# Patient Record
Sex: MALE | Race: WHITE | HISPANIC OR LATINO
[De-identification: names, ages, dates, MRNs, and addresses within clinical notes are randomized per-mention and may not be internally consistent; named-entity substitution may affect disease eponyms.]

---

## 2022-02-24 PROBLEM — Z00.00 ENCOUNTER FOR PREVENTIVE HEALTH EXAMINATION: Status: ACTIVE | Noted: 2022-02-24

## 2022-03-01 ENCOUNTER — APPOINTMENT (OUTPATIENT)
Dept: ORTHOPEDIC SURGERY | Facility: CLINIC | Age: 57
End: 2022-03-01
Payer: COMMERCIAL

## 2022-03-01 VITALS
BODY MASS INDEX: 34.1 KG/M2 | HEART RATE: 70 BPM | HEIGHT: 68 IN | DIASTOLIC BLOOD PRESSURE: 76 MMHG | WEIGHT: 225 LBS | SYSTOLIC BLOOD PRESSURE: 152 MMHG

## 2022-03-01 DIAGNOSIS — Z72.89 OTHER PROBLEMS RELATED TO LIFESTYLE: ICD-10-CM

## 2022-03-01 DIAGNOSIS — Z83.3 FAMILY HISTORY OF DIABETES MELLITUS: ICD-10-CM

## 2022-03-01 DIAGNOSIS — M79.642 PAIN IN LEFT HAND: ICD-10-CM

## 2022-03-01 DIAGNOSIS — Z82.49 FAMILY HISTORY OF ISCHEMIC HEART DISEASE AND OTHER DISEASES OF THE CIRCULATORY SYSTEM: ICD-10-CM

## 2022-03-01 PROCEDURE — 73130 X-RAY EXAM OF HAND: CPT | Mod: LT

## 2022-03-01 PROCEDURE — 99203 OFFICE O/P NEW LOW 30 MIN: CPT

## 2022-03-01 RX ORDER — OLMESARTAN MEDOXOMIL 20 MG/1
20 TABLET, FILM COATED ORAL
Refills: 0 | Status: ACTIVE | COMMUNITY

## 2022-03-01 RX ORDER — MELOXICAM 15 MG/1
15 TABLET ORAL
Qty: 30 | Refills: 0 | Status: ACTIVE | COMMUNITY
Start: 2022-03-01 | End: 1900-01-01

## 2022-03-22 ENCOUNTER — NON-APPOINTMENT (OUTPATIENT)
Age: 57
End: 2022-03-22

## 2022-03-22 ENCOUNTER — APPOINTMENT (OUTPATIENT)
Dept: ORTHOPEDIC SURGERY | Facility: CLINIC | Age: 57
End: 2022-03-22
Payer: COMMERCIAL

## 2022-03-22 VITALS — DIASTOLIC BLOOD PRESSURE: 82 MMHG | HEART RATE: 70 BPM | SYSTOLIC BLOOD PRESSURE: 136 MMHG

## 2022-03-22 PROCEDURE — 99214 OFFICE O/P EST MOD 30 MIN: CPT | Mod: 25

## 2022-03-22 PROCEDURE — 20600 DRAIN/INJ JOINT/BURSA W/O US: CPT | Mod: LT

## 2022-03-22 RX ADMIN — METHYLPREDNISOLONE ACETATE 0.75 MG/ML: 40 INJECTION, SUSPENSION INTRA-ARTICULAR; INTRALESIONAL; INTRAMUSCULAR; INTRASYNOVIAL; SOFT TISSUE at 00:00

## 2022-03-22 RX ADMIN — LIDOCAINE HYDROCHLORIDE 0.75 %: 10 INJECTION, SOLUTION INFILTRATION; PERINEURAL at 00:00

## 2022-03-30 RX ORDER — LIDOCAINE HYDROCHLORIDE 10 MG/ML
1 INJECTION, SOLUTION INFILTRATION; PERINEURAL
Refills: 0 | Status: COMPLETED | OUTPATIENT
Start: 2022-03-22

## 2022-03-30 RX ORDER — METHYLPRED ACET/NACL,ISO-OS/PF 40 MG/ML
40 VIAL (ML) INJECTION
Qty: 1 | Refills: 0 | Status: COMPLETED | OUTPATIENT
Start: 2022-03-22

## 2022-04-05 ENCOUNTER — APPOINTMENT (OUTPATIENT)
Dept: ORTHOPEDIC SURGERY | Facility: CLINIC | Age: 57
End: 2022-04-05
Payer: COMMERCIAL

## 2022-04-05 VITALS — SYSTOLIC BLOOD PRESSURE: 145 MMHG | DIASTOLIC BLOOD PRESSURE: 90 MMHG | HEART RATE: 80 BPM

## 2022-04-05 PROCEDURE — 99214 OFFICE O/P EST MOD 30 MIN: CPT

## 2023-07-26 ENCOUNTER — APPOINTMENT (OUTPATIENT)
Dept: ORTHOPEDIC SURGERY | Facility: CLINIC | Age: 58
End: 2023-07-26
Payer: COMMERCIAL

## 2023-07-26 PROCEDURE — 73110 X-RAY EXAM OF WRIST: CPT | Mod: LT

## 2023-07-26 PROCEDURE — 99214 OFFICE O/P EST MOD 30 MIN: CPT

## 2023-07-26 PROCEDURE — 73130 X-RAY EXAM OF HAND: CPT | Mod: LT

## 2023-07-26 NOTE — PHYSICAL EXAM
[de-identified] : - Constitutional: This is a male in no obvious distress.  \par - Psych: Patient is alert and oriented to person, place and time.  Patient has a normal mood and affect.\par - Cardiovascular: Normal pulses throughout the upper extremities.  No significant varicosities are noted in the upper extremities. \par - Neuro: Strength and sensation are intact throughout the upper extremities.  Patient has normal coordination.\par - Respiratory:  Patient exhibits no evidence of shortness of breath or difficulty breathing.\par - Skin: No rashes, lesions, or other abnormalities are noted in the upper extremities.\par \par --- \par \par Side: Left Thumb\par -  There is swelling and tenderness along the basal joint of the thumb.  \par -  There is a positive grind test.  \par -  There is no instability of the basal joint of the thumb.  \par -  There is no evidence of a trigger thumb.  \par -  There is no evidence of DeQuervain's tendonitis.  \par -  Provocative signs for carpal tunnel syndrome are negative.  \par -  There is normal strength and sensation distally along the radial, ulnar and median nerve distributions.  \par -  There is full composite range-of-motion of the other digits into the palm.  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 MIld scaphotrapezial arthritis.

## 2023-07-26 NOTE — DISCUSSION/SUMMARY
[FreeTextEntry1] : He has findings consistent with chronic advanced left thumb pain secondary to basal joint arthritis.  He has not improved with nonoperative management in the past by Dr. Aleman.\par \par I had a discussion with the patient regarding today's visit, the prognosis of this diagnosis, and treatment recommendations and options. At this time, he would like to go ahead and schedule basal joint arthroplasty. The nature of the operation, expected recovery and all postoperative protocols were reviewed with the patient in great detail. Again, he would like to proceed. He will speak with our surgical scheduler and be scheduled for surgery in the near future, when it is convenient for him. \par \par -  The nature and purposes of the operation/procedure was discussed in detail.  I discussed the surgical procedure in detail, as well as expected postoperative recovery and outcome.\par -  Possible risks, benefits, and complications (from known and unknown causes) of the procedure were discussed in detail.  \par -  Possible non-operative alternatives to the proposed treatment were discussed in detail.  \par -  He was told that possible risks/complications include, but are not limited to:  Infection, nerve or vessel injury, stiffness, painful scar, poor outcome, need for additional surgical procedures, and other unforeseen complications.  \par -  In addition, the possibility of an "unsuccessful outcome," despite "successful surgery," was discussed with the patient.  \par -  The patient fully understands these risks and wishes to proceed.  \par -  I had a lengthy discussion with the patient regarding today's visit, the diagnosis, and my surgical treatment recommendations.  The patient has agreed to this plan of management and has expressed full understanding.  All questions were fully answered to the patient's satisfaction.\par \par My cumulative time spent on this visit included: Preparation for the visit, review of the medical records, review of pertinent diagnostic studies, examination and counseling of the patient on the above diagnosis, treatment plan and prognosis, orders of diagnostic tests, medication and/or appropriate procedures and documentation in the medical records of today's visit.

## 2023-07-26 NOTE — ADDENDUM
[FreeTextEntry1] : I, Estella Tidwell, acted solely as a scribe for Dr. Colon on this date on 07/26/2023.

## 2023-07-26 NOTE — HISTORY OF PRESENT ILLNESS
[Right] : right hand dominant [FreeTextEntry1] : He comes in today for evaluation of left thumb pain x 1 year. He has a history of arthritis at the left thumb CMC joint, for which he has been treated by Dr. Aleman in the past. He has had several injections at his basal joint. He last spoke with Dr. Aleman about undergoing surgery of a basal joint arthroplasty, however Dr. Aleman no longer performs this procedures. He does also note mild numbness. He rates his pain as a 7 out of 10 at this time.\par \par He is a teacher. \par

## 2023-07-26 NOTE — END OF VISIT
[FreeTextEntry3] : This note was written by Estella Tidwell on 07/26/2023 acting solely as a scribe for Dr. Titus Colon.\par  \par All medical record entries made by the Scribe were at my, Dr. Titus Colon, direction and personally dictated by me on 07/26/2023. I have personally reviewed the chart and agree that the record accurately reflects my personal performance of the history, physical exam, assessment and plan.

## 2023-08-04 ENCOUNTER — OUTPATIENT (OUTPATIENT)
Dept: OUTPATIENT SERVICES | Facility: HOSPITAL | Age: 58
LOS: 1 days | End: 2023-08-04
Payer: COMMERCIAL

## 2023-08-04 VITALS
HEART RATE: 82 BPM | RESPIRATION RATE: 14 BRPM | DIASTOLIC BLOOD PRESSURE: 78 MMHG | HEIGHT: 68 IN | SYSTOLIC BLOOD PRESSURE: 168 MMHG | TEMPERATURE: 98 F | OXYGEN SATURATION: 96 % | WEIGHT: 231.49 LBS

## 2023-08-04 DIAGNOSIS — M18.12 UNILATERAL PRIMARY OSTEOARTHRITIS OF FIRST CARPOMETACARPAL JOINT, LEFT HAND: ICD-10-CM

## 2023-08-04 DIAGNOSIS — Z01.818 ENCOUNTER FOR OTHER PREPROCEDURAL EXAMINATION: ICD-10-CM

## 2023-08-04 DIAGNOSIS — Z96.641 PRESENCE OF RIGHT ARTIFICIAL HIP JOINT: Chronic | ICD-10-CM

## 2023-08-04 DIAGNOSIS — M79.645 PAIN IN LEFT FINGER(S): ICD-10-CM

## 2023-08-04 LAB
ALBUMIN SERPL ELPH-MCNC: 4.2 G/DL — SIGNIFICANT CHANGE UP (ref 3.3–5)
ALP SERPL-CCNC: 126 U/L — HIGH (ref 30–120)
ALT FLD-CCNC: 71 U/L DA — HIGH (ref 10–60)
ANION GAP SERPL CALC-SCNC: 10 MMOL/L — SIGNIFICANT CHANGE UP (ref 5–17)
AST SERPL-CCNC: 87 U/L — HIGH (ref 10–40)
BILIRUB SERPL-MCNC: 0.8 MG/DL — SIGNIFICANT CHANGE UP (ref 0.2–1.2)
BUN SERPL-MCNC: 9 MG/DL — SIGNIFICANT CHANGE UP (ref 7–23)
CALCIUM SERPL-MCNC: 9.8 MG/DL — SIGNIFICANT CHANGE UP (ref 8.4–10.5)
CHLORIDE SERPL-SCNC: 93 MMOL/L — LOW (ref 96–108)
CO2 SERPL-SCNC: 26 MMOL/L — SIGNIFICANT CHANGE UP (ref 22–31)
CREAT SERPL-MCNC: 0.78 MG/DL — SIGNIFICANT CHANGE UP (ref 0.5–1.3)
EGFR: 103 ML/MIN/1.73M2 — SIGNIFICANT CHANGE UP
GLUCOSE SERPL-MCNC: 130 MG/DL — HIGH (ref 70–99)
HCT VFR BLD CALC: 38.5 % — LOW (ref 39–50)
HGB BLD-MCNC: 13.3 G/DL — SIGNIFICANT CHANGE UP (ref 13–17)
MCHC RBC-ENTMCNC: 33.2 PG — SIGNIFICANT CHANGE UP (ref 27–34)
MCHC RBC-ENTMCNC: 34.5 GM/DL — SIGNIFICANT CHANGE UP (ref 32–36)
MCV RBC AUTO: 96 FL — SIGNIFICANT CHANGE UP (ref 80–100)
NRBC # BLD: 0 /100 WBCS — SIGNIFICANT CHANGE UP (ref 0–0)
PLATELET # BLD AUTO: 157 K/UL — SIGNIFICANT CHANGE UP (ref 150–400)
POTASSIUM SERPL-MCNC: 4.9 MMOL/L — SIGNIFICANT CHANGE UP (ref 3.5–5.3)
POTASSIUM SERPL-SCNC: 4.9 MMOL/L — SIGNIFICANT CHANGE UP (ref 3.5–5.3)
PROT SERPL-MCNC: 9.9 G/DL — HIGH (ref 6–8.3)
RBC # BLD: 4.01 M/UL — LOW (ref 4.2–5.8)
RBC # FLD: 11.4 % — SIGNIFICANT CHANGE UP (ref 10.3–14.5)
SODIUM SERPL-SCNC: 129 MMOL/L — LOW (ref 135–145)
WBC # BLD: 5.3 K/UL — SIGNIFICANT CHANGE UP (ref 3.8–10.5)
WBC # FLD AUTO: 5.3 K/UL — SIGNIFICANT CHANGE UP (ref 3.8–10.5)

## 2023-08-04 PROCEDURE — 36415 COLL VENOUS BLD VENIPUNCTURE: CPT

## 2023-08-04 PROCEDURE — 85027 COMPLETE CBC AUTOMATED: CPT

## 2023-08-04 PROCEDURE — G0463: CPT

## 2023-08-04 PROCEDURE — 80053 COMPREHEN METABOLIC PANEL: CPT

## 2023-08-04 NOTE — H&P PST ADULT - PROBLEM SELECTOR PLAN 1
left thumb basal joint arthroplasty. surgical wash instructions reviewed and verbalized understanding. medical clearance requested. NPO after midnight

## 2023-08-04 NOTE — H&P PST ADULT - NSANTHOSAYNRD_GEN_A_CORE
neck 16.5 inches/No. CJ screening performed.  STOP BANG Legend: 0-2 = LOW Risk; 3-4 = INTERMEDIATE Risk; 5-8 = HIGH Risk

## 2023-08-04 NOTE — H&P PST ADULT - NSICDXFAMILYHX_GEN_ALL_CORE_FT
FAMILY HISTORY:  Father  Still living? No  Family history of type 2 diabetes mellitus, Age at diagnosis: Age Unknown    Mother  Still living? Unknown  Family history of COPD (chronic obstructive pulmonary disease), Age at diagnosis: Age Unknown  Family history of rheumatoid arthritis, Age at diagnosis: Age Unknown    Sibling  Still living? Yes, Estimated age: 61-70  Family history of rheumatoid arthritis, Age at diagnosis: Age Unknown    Grandparent  Still living? No  Family history of type 2 diabetes mellitus, Age at diagnosis: Age Unknown  FHx: coronary artery disease, Age at diagnosis: Age Unknown    Aunt  Still living? No  Family history of asthma, Age at diagnosis: Age Unknown

## 2023-08-04 NOTE — H&P PST ADULT - NSICDXPASTSURGICALHX_GEN_ALL_CORE_FT
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

## 2023-08-04 NOTE — H&P PST ADULT - HISTORY OF PRESENT ILLNESS
59 yo male presents with 2 year history of left thumb pain. He was treated in the past with splinting and cortisone injections with no relief. Now reports decreased strength, decreased ROM and pain. Pain now 6/10 at rest and increased to 10/10. Denies using any current pain relief measures but still wears a splint at night with no relief.

## 2023-08-04 NOTE — H&P PST ADULT - MUSCULOSKELETAL
left thumb/normal/no joint swelling/no joint erythema/no joint warmth/decreased ROM/decreased ROM due to pain/normal gait/strength 5/5 bilateral upper extremities/strength 5/5 bilateral lower extremities/decreased strength details…

## 2023-08-04 NOTE — H&P PST ADULT - NSICDXPASTMEDICALHX_GEN_ALL_CORE_FT
PAST MEDICAL HISTORY:  At risk for sleep apnea     Bilateral hearing loss     COVID-19 vaccine series completed     Environmental allergies     History of right bundle branch block     History of vertigo     Hypertension     Obesity, Class II, BMI 35-39.9     Osteoarthritis     Pain of left thumb

## 2023-08-08 ENCOUNTER — NON-APPOINTMENT (OUTPATIENT)
Age: 58
End: 2023-08-08

## 2023-08-14 ENCOUNTER — TRANSCRIPTION ENCOUNTER (OUTPATIENT)
Age: 58
End: 2023-08-14

## 2023-08-15 ENCOUNTER — RESULT REVIEW (OUTPATIENT)
Age: 58
End: 2023-08-15

## 2023-08-15 ENCOUNTER — TRANSCRIPTION ENCOUNTER (OUTPATIENT)
Age: 58
End: 2023-08-15

## 2023-08-15 ENCOUNTER — APPOINTMENT (OUTPATIENT)
Dept: ORTHOPEDIC SURGERY | Facility: HOSPITAL | Age: 58
End: 2023-08-15

## 2023-08-15 ENCOUNTER — OUTPATIENT (OUTPATIENT)
Dept: OUTPATIENT SERVICES | Facility: HOSPITAL | Age: 58
LOS: 1 days | End: 2023-08-15
Payer: COMMERCIAL

## 2023-08-15 VITALS
RESPIRATION RATE: 11 BRPM | WEIGHT: 222.45 LBS | DIASTOLIC BLOOD PRESSURE: 74 MMHG | HEART RATE: 64 BPM | SYSTOLIC BLOOD PRESSURE: 155 MMHG | OXYGEN SATURATION: 99 % | TEMPERATURE: 98 F | HEIGHT: 68 IN

## 2023-08-15 VITALS — DIASTOLIC BLOOD PRESSURE: 76 MMHG | RESPIRATION RATE: 17 BRPM | SYSTOLIC BLOOD PRESSURE: 150 MMHG | HEART RATE: 59 BPM

## 2023-08-15 DIAGNOSIS — Z96.641 PRESENCE OF RIGHT ARTIFICIAL HIP JOINT: Chronic | ICD-10-CM

## 2023-08-15 DIAGNOSIS — M18.12 UNILATERAL PRIMARY OSTEOARTHRITIS OF FIRST CARPOMETACARPAL JOINT, LEFT HAND: ICD-10-CM

## 2023-08-15 PROCEDURE — 25447 ARTHRP NTRCRP/CRP/MTCR NTRPS: CPT | Mod: LT

## 2023-08-15 PROCEDURE — 88304 TISSUE EXAM BY PATHOLOGIST: CPT | Mod: 26

## 2023-08-15 PROCEDURE — C1713: CPT

## 2023-08-15 PROCEDURE — 76000 FLUOROSCOPY <1 HR PHYS/QHP: CPT

## 2023-08-15 PROCEDURE — 88304 TISSUE EXAM BY PATHOLOGIST: CPT

## 2023-08-15 DEVICE — PIN 3.2MM 1/8IN X 9IN STEIN SNGL THREADED TROCAR: Type: IMPLANTABLE DEVICE | Site: LEFT | Status: FUNCTIONAL

## 2023-08-15 DEVICE — ARTHREX INTERNALBRACE HAND/WRIST: Type: IMPLANTABLE DEVICE | Site: LEFT | Status: FUNCTIONAL

## 2023-08-15 RX ORDER — CHLORHEXIDINE GLUCONATE 213 G/1000ML
1 SOLUTION TOPICAL ONCE
Refills: 0 | Status: COMPLETED | OUTPATIENT
Start: 2023-08-15 | End: 2023-08-15

## 2023-08-15 RX ORDER — ONDANSETRON 8 MG/1
4 TABLET, FILM COATED ORAL ONCE
Refills: 0 | Status: DISCONTINUED | OUTPATIENT
Start: 2023-08-15 | End: 2023-08-29

## 2023-08-15 RX ORDER — HYDROMORPHONE HYDROCHLORIDE 2 MG/ML
0.5 INJECTION INTRAMUSCULAR; INTRAVENOUS; SUBCUTANEOUS
Refills: 0 | Status: DISCONTINUED | OUTPATIENT
Start: 2023-08-15 | End: 2023-08-15

## 2023-08-15 RX ORDER — OXYCODONE AND ACETAMINOPHEN 5; 325 MG/1; MG/1
1 TABLET ORAL
Qty: 15 | Refills: 0
Start: 2023-08-15

## 2023-08-15 RX ORDER — OLMESARTAN MEDOXOMIL 5 MG/1
1 TABLET, FILM COATED ORAL
Refills: 0 | DISCHARGE

## 2023-08-15 RX ORDER — CETIRIZINE HYDROCHLORIDE 10 MG/1
1 TABLET ORAL
Refills: 0 | DISCHARGE

## 2023-08-15 RX ORDER — SODIUM CHLORIDE 9 MG/ML
1000 INJECTION, SOLUTION INTRAVENOUS
Refills: 0 | Status: DISCONTINUED | OUTPATIENT
Start: 2023-08-15 | End: 2023-08-29

## 2023-08-15 RX ORDER — IBUPROFEN 200 MG
1 TABLET ORAL
Qty: 15 | Refills: 0
Start: 2023-08-15

## 2023-08-15 RX ORDER — CEFAZOLIN SODIUM 1 G
2000 VIAL (EA) INJECTION ONCE
Refills: 0 | Status: COMPLETED | OUTPATIENT
Start: 2023-08-15 | End: 2023-08-15

## 2023-08-15 RX ORDER — OXYCODONE AND ACETAMINOPHEN 5; 325 MG/1; MG/1
1 TABLET ORAL ONCE
Refills: 0 | Status: DISCONTINUED | OUTPATIENT
Start: 2023-08-15 | End: 2023-08-15

## 2023-08-15 RX ADMIN — CHLORHEXIDINE GLUCONATE 1 APPLICATION(S): 213 SOLUTION TOPICAL at 11:38

## 2023-08-15 NOTE — BRIEF OPERATIVE NOTE - NSICDXBRIEFPOSTOP_GEN_ALL_CORE_FT
POST-OP DIAGNOSIS:  Osteoarthritis of carpometacarpal joint of left thumb 15-Aug-2023 14:07:10  Titus Chen

## 2023-08-15 NOTE — BRIEF OPERATIVE NOTE - OPERATION/FINDINGS
Severe DJD Left 1st CMC joint. Stable stablilization with swivel-lock implant; stable stabilization on Fluoro images

## 2023-08-15 NOTE — ASU DISCHARGE PLAN (ADULT/PEDIATRIC) - NS MD DC FALL RISK RISK
For information on Fall & Injury Prevention, visit: [email or web address] OR  [email or web address] OR  https://www.cdc.gov/steadi/patient.html

## 2023-08-15 NOTE — ASU DISCHARGE PLAN (ADULT/PEDIATRIC) - ASU DC SPECIAL INSTRUCTIONSFT
Elevate Left  arm on pillow when lying. Elevate in sling when up, chair sitting, & walking.  Apply ice paks to Left Wrist area for 30 min. every 3 hours daily to reduce swelling & pain.  Keep bandage & splint clean & dry daily.  Cover the bandage & splint on your arm in the shower with a Cast Bag or a plastic garbage bag & tape to seal it so that no water gets in.  Call the Surgeon for fever, severe pain.  See your Surgeon on 8/25 for office follow-up visit. Call for appointment.

## 2023-08-15 NOTE — BRIEF OPERATIVE NOTE - NSICDXBRIEFPREOP_GEN_ALL_CORE_FT
PRE-OP DIAGNOSIS:  Osteoarthritis of carpometacarpal joint of left thumb 15-Aug-2023 14:06:35  Titus Chen

## 2023-08-15 NOTE — ASU PATIENT PROFILE, ADULT - NS SC CAGE ALCOHOL GUILTY ABOUT

## 2023-08-15 NOTE — ASU DISCHARGE PLAN (ADULT/PEDIATRIC) - CARE PROVIDER_API CALL
Titus Colon)  Orthopaedic Surgery; Surgery of the Hand  [address], [address]  [place] [address]  Phone: [phone]  Fax: [phone]  Established Patient  Scheduled Appointment: 08/25/2023

## 2023-08-16 PROBLEM — Z86.79 PERSONAL HISTORY OF OTHER DISEASES OF THE CIRCULATORY SYSTEM: Chronic | Status: ACTIVE | Noted: 2023-08-04

## 2023-08-16 PROBLEM — H91.93 UNSPECIFIED HEARING LOSS, BILATERAL: Chronic | Status: ACTIVE | Noted: 2023-08-04

## 2023-08-16 PROBLEM — M79.645 PAIN IN LEFT FINGER(S): Chronic | Status: ACTIVE | Noted: 2023-08-04

## 2023-08-16 PROBLEM — Z87.898 PERSONAL HISTORY OF OTHER SPECIFIED CONDITIONS: Chronic | Status: ACTIVE | Noted: 2023-08-04

## 2023-08-16 PROBLEM — I10 ESSENTIAL (PRIMARY) HYPERTENSION: Chronic | Status: ACTIVE | Noted: 2023-08-04

## 2023-08-16 PROBLEM — Z91.89 OTHER SPECIFIED PERSONAL RISK FACTORS, NOT ELSEWHERE CLASSIFIED: Chronic | Status: ACTIVE | Noted: 2023-08-04

## 2023-08-16 PROBLEM — E66.9 OBESITY, UNSPECIFIED: Chronic | Status: ACTIVE | Noted: 2023-08-04

## 2023-08-16 PROBLEM — Z91.09 OTHER ALLERGY STATUS, OTHER THAN TO DRUGS AND BIOLOGICAL SUBSTANCES: Chronic | Status: ACTIVE | Noted: 2023-08-04

## 2023-08-16 PROBLEM — M19.90 UNSPECIFIED OSTEOARTHRITIS, UNSPECIFIED SITE: Chronic | Status: ACTIVE | Noted: 2023-08-04

## 2023-08-16 PROBLEM — Z92.29 PERSONAL HISTORY OF OTHER DRUG THERAPY: Chronic | Status: ACTIVE | Noted: 2023-08-04

## 2023-08-25 ENCOUNTER — APPOINTMENT (OUTPATIENT)
Dept: ORTHOPEDIC SURGERY | Facility: CLINIC | Age: 58
End: 2023-08-25
Payer: COMMERCIAL

## 2023-08-25 PROCEDURE — 99024 POSTOP FOLLOW-UP VISIT: CPT

## 2023-08-25 PROCEDURE — 29125 APPL SHORT ARM SPLINT STATIC: CPT | Mod: 58,LT

## 2023-08-25 NOTE — HISTORY OF PRESENT ILLNESS
[Chills] : no chills [Fever] : no fever [de-identified] : 10 days postoperative. [de-identified] : 10 days status post left thumb basal joint arthroplasty.  Date of surgery: 8/15/2023.  He is doing well at this time. He offers no new complaints regarding his left hand at this time.  [de-identified] : Examination of his left thumb after the splint and dressing were removed demonstrates his incision to be clean and dry.  There is no drainage or evidence of infection.  There is some swelling and ecchymosis.  His basal joint arthroplasty is stable to stress testing.  He has normal sensation to light touch along the dorsal radial sensory nerve branch. [de-identified] : Stable, 10 days postoperative. [de-identified] : The suture ends were cut and Steri-Strips were applied.  He was fitted with a thumb spica fiberglass splint, because of the swelling.  He was instructed on full-time protection and activity modification.  He will follow-up in 2 weeks.

## 2023-08-30 ENCOUNTER — NON-APPOINTMENT (OUTPATIENT)
Age: 58
End: 2023-08-30

## 2023-09-08 ENCOUNTER — APPOINTMENT (OUTPATIENT)
Dept: ORTHOPEDIC SURGERY | Facility: CLINIC | Age: 58
End: 2023-09-08
Payer: COMMERCIAL

## 2023-09-08 PROCEDURE — 99024 POSTOP FOLLOW-UP VISIT: CPT

## 2023-09-08 PROCEDURE — 73130 X-RAY EXAM OF HAND: CPT | Mod: LT

## 2023-09-08 NOTE — HISTORY OF PRESENT ILLNESS
[Chills] : no chills [Fever] : no fever [de-identified] : 24 days postoperative. [de-identified] : 24 days status post left thumb basal joint arthroplasty.  Date of surgery: 8/15/2023.  He is doing well.  He rates his pain as 1 out of 10. [de-identified] : Examination of his left thumb after the splint was removed demonstrates his incision to be healing well.  There is decreased swelling.  His basal joint arthroplasty is stable to stress testing.  He has normal sensation to light touch along the dorsal radial sensory nerve branch. [de-identified] : AP, lateral, and oblique radiographs of the left hand demonstrate good alignment of his basal joint arthroplasty.  AP, lateral and oblique radiographs of his left hand demonstrate his basal joint arthroplasty to be well aligned. [de-identified] : Stable, 24 days postoperative. [de-identified] : At this time, he was fitted with a removable splint.  He was instructed on gentle range of motion exercises and scar massage and desensitization.  He will avoid and the heavy lifting or grasping.  He will follow-up in 3 weeks.

## 2023-09-08 NOTE — END OF VISIT
[FreeTextEntry3] : This note was written by Zeeshan Moss on 09/08/2023 acting solely as a scribe for Dr. Titus Colon.   All medical record entries made by the Scribe were at my, Dr. Titus Colon, direction and personally dictated by me on 09/08/2023. I have personally reviewed the chart and agree that the record accurately reflects my personal performance of the history, physical exam, assessment and plan.

## 2023-09-08 NOTE — ADDENDUM
[FreeTextEntry1] :  I, Zeeshan Moss, acted solely as a scribe for Dr. Colon on this date on 09/08/2023.

## 2023-09-20 ENCOUNTER — NON-APPOINTMENT (OUTPATIENT)
Age: 58
End: 2023-09-20

## 2023-09-29 ENCOUNTER — APPOINTMENT (OUTPATIENT)
Dept: ORTHOPEDIC SURGERY | Facility: CLINIC | Age: 58
End: 2023-09-29
Payer: COMMERCIAL

## 2023-09-29 PROCEDURE — 99024 POSTOP FOLLOW-UP VISIT: CPT

## 2023-10-18 ENCOUNTER — NON-APPOINTMENT (OUTPATIENT)
Age: 58
End: 2023-10-18

## 2023-10-20 PROBLEM — M18.12 PRIMARY OSTEOARTHRITIS OF FIRST CARPOMETACARPAL JOINT OF LEFT HAND: Status: ACTIVE | Noted: 2022-03-01

## 2023-10-27 ENCOUNTER — APPOINTMENT (OUTPATIENT)
Dept: ORTHOPEDIC SURGERY | Facility: CLINIC | Age: 58
End: 2023-10-27
Payer: COMMERCIAL

## 2023-10-27 DIAGNOSIS — M18.12 UNILATERAL PRIMARY OSTEOARTHRITIS OF FIRST CARPOMETACARPAL JOINT, LEFT HAND: ICD-10-CM

## 2023-10-27 PROCEDURE — 99024 POSTOP FOLLOW-UP VISIT: CPT

## 2023-10-27 PROCEDURE — 73110 X-RAY EXAM OF WRIST: CPT | Mod: LT

## 2023-10-27 PROCEDURE — 73130 X-RAY EXAM OF HAND: CPT | Mod: LT

## 2024-10-01 ENCOUNTER — APPOINTMENT (OUTPATIENT)
Dept: HEPATOLOGY | Facility: CLINIC | Age: 59
End: 2024-10-01
Payer: COMMERCIAL

## 2024-10-01 VITALS
HEART RATE: 76 BPM | BODY MASS INDEX: 33.04 KG/M2 | SYSTOLIC BLOOD PRESSURE: 120 MMHG | TEMPERATURE: 96 F | HEIGHT: 68 IN | DIASTOLIC BLOOD PRESSURE: 70 MMHG | WEIGHT: 218 LBS | OXYGEN SATURATION: 98 %

## 2024-10-01 DIAGNOSIS — Z82.5 FAMILY HISTORY OF ASTHMA AND OTHER CHRONIC LOWER RESPIRATORY DISEASES: ICD-10-CM

## 2024-10-01 DIAGNOSIS — K70.9 ALCOHOLIC LIVER DISEASE, UNSPECIFIED: ICD-10-CM

## 2024-10-01 DIAGNOSIS — K76.0 FATTY (CHANGE OF) LIVER, NOT ELSEWHERE CLASSIFIED: ICD-10-CM

## 2024-10-01 PROCEDURE — 99204 OFFICE O/P NEW MOD 45 MIN: CPT

## 2024-10-01 RX ORDER — CETIRIZINE HYDROCHLORIDE 10 MG/1
CAPSULE, LIQUID FILLED ORAL
Refills: 0 | Status: ACTIVE | COMMUNITY

## 2024-10-01 RX ORDER — MV-MIN/FOLIC/K1/LYCOPEN/LUTEIN 300-60 MCG
TABLET ORAL
Refills: 0 | Status: ACTIVE | COMMUNITY

## 2024-10-02 NOTE — HISTORY OF PRESENT ILLNESS
[de-identified] : - Chief Complaint (CC) : Patient is concerned about his liver health. - History of Present Illness : Hemanth Mejia, a 59-year-old male, referred by Dr. Peter Philippe for liver disease; presents with concerns about possible liver issues, including a high bilirubin level detected about a year ago and fat in the liver noted in an ultrasound done over summer. The patient reports a habitual intake of 6-12 cans of beer daily. However, he does not report any symptoms associated with liver issues. The patient has no history of blood transfusions, tattoos, or drug abuse. He denies any liver related hospitalizations or sx of liver decompensation  - Past Medical History : The patient suffers from osteoarthritis and high blood pressure for which he takes Benicar. His blood pressure has been stabilized to 120/70 in the last few months. - Past Surgical History : Mr. Mejia underwent partial right hip replacement approximately 13 to 15 years ago. - Family History : - Grandfather  from alcohol-induced liver cancer  - Family history of asthma  - Social History : - Occupation and Employment: 7th Grade  in [employer]  - Marital Status: Single  - Lifestyle and Habits: Regular alcohol consumption  - Substance Use: Alcohol (6-12 cans of beer daily)  - Review of Systems : General: Reports no weight loss, fever, fatigue, or night sweats. Neurological: No reported symptoms of headache, dizziness, or changes in coordination or balance. Musculoskeletal: Suffers from osteoarthritis Cardiovascular: Blood pressure managed with Benicar. Respiratory: No reported symptoms. Gastrointestinal: No reported discomfort. Genitourinary: No reported discomfort or changes. Integumentary: No reported discomfort or changes. Psychiatric: Reports no changes in mood, affect, or sleep patterns. - Medications : - Benicar for high blood pressure  - Allergies : - Pollen  Objective: - Diagnostic Results : Pending ultrasound and fibroscan results.

## 2024-10-02 NOTE — ASSESSMENT
[FreeTextEntry1] : 60 y/o m referred by Dr Peter Philippe for evaluation of liver disease. No records available. Following issues were d/w pt in detail.  Risk factors for liver disease- Etoh and Metabolic syndrome - MetALD  Pt educated on the diagnosis and natural history of liver disease leading to cirrhosis including the manifestations of ESLD (HE, HCC, ascites, variceal bleeding etc). The discussion of medical management and/or candidacy for liver transplantation was discussed. The importance of Etoh and smoking cessation, adequate nutrition, activity, reporting new symptoms and compliance with follow up appointments advised.  I also emphasized the importance of HCC screening Q6 months and variceal screening as determined. Avoiding NSAIDs and common hepatotoxic medications stressed.  Hepatitis serologies will be checked if not already and patient will need appropriate Hep A/B vaccination if indicated.  The role of other meds like statins, acetaminophen safety was also discussed.  All questions were answered. Patient demonstrated understanding.  Patient is aware that they have several components of the metabolic syndrome including weight gain during adulthood. The benefit of a low glycemic diet and exercise were discussed. The patient needs improved treatment of diabetes, HTN, elevated cholesterol as appropriate. Long term sequelae of Fatty liver disease including progression to decompensated cirrhosis and hcc explained to pt.  The utility of nutrition consult explained. The role of liver biopsy also discussed.  Patient demonstrated understanding. Will order fibroscan, Factors associated with unreliable results included BMI >30 kg/m2, age >52 years, female sex,  inexperience, and type 2 diabetes mellitus discussed. MRE is also a consideration after fibroscan Will also order chronic liver disease w/u and other imaging as needed complete etoh abstinence and RPP advised; to discuss role of Acamprosate after updated labs f/u in 6-8 weeks I spent total of 50  minutes on this patient encounter.

## 2024-10-02 NOTE — HISTORY OF PRESENT ILLNESS
[de-identified] : - Chief Complaint (CC) : Patient is concerned about his liver health. - History of Present Illness : Hemanth Mejia, a 59-year-old male, referred by Dr. Peter Philippe for liver disease; presents with concerns about possible liver issues, including a high bilirubin level detected about a year ago and fat in the liver noted in an ultrasound done over summer. The patient reports a habitual intake of 6-12 cans of beer daily. However, he does not report any symptoms associated with liver issues. The patient has no history of blood transfusions, tattoos, or drug abuse. He denies any liver related hospitalizations or sx of liver decompensation  - Past Medical History : The patient suffers from osteoarthritis and high blood pressure for which he takes Benicar. His blood pressure has been stabilized to 120/70 in the last few months. - Past Surgical History : Mr. Mejia underwent partial right hip replacement approximately 13 to 15 years ago. - Family History : - Grandfather  from alcohol-induced liver cancer  - Family history of asthma  - Social History : - Occupation and Employment: 7th Grade  in [employer]  - Marital Status: Single  - Lifestyle and Habits: Regular alcohol consumption  - Substance Use: Alcohol (6-12 cans of beer daily)  - Review of Systems : General: Reports no weight loss, fever, fatigue, or night sweats. Neurological: No reported symptoms of headache, dizziness, or changes in coordination or balance. Musculoskeletal: Suffers from osteoarthritis Cardiovascular: Blood pressure managed with Benicar. Respiratory: No reported symptoms. Gastrointestinal: No reported discomfort. Genitourinary: No reported discomfort or changes. Integumentary: No reported discomfort or changes. Psychiatric: Reports no changes in mood, affect, or sleep patterns. - Medications : - Benicar for high blood pressure  - Allergies : - Pollen  Objective: - Diagnostic Results : Pending ultrasound and fibroscan results.

## 2024-10-22 ENCOUNTER — APPOINTMENT (OUTPATIENT)
Dept: HEPATOLOGY | Facility: CLINIC | Age: 59
End: 2024-10-22

## 2024-10-22 DIAGNOSIS — Z78.9 OTHER SPECIFIED HEALTH STATUS: ICD-10-CM

## 2024-10-22 PROCEDURE — 76981 USE PARENCHYMA: CPT

## 2024-10-25 ENCOUNTER — LABORATORY RESULT (OUTPATIENT)
Age: 59
End: 2024-10-25

## 2024-10-25 PROBLEM — Z78.9 CONSUMES ALCOHOL: Status: ACTIVE | Noted: 2022-03-01

## 2024-10-30 ENCOUNTER — NON-APPOINTMENT (OUTPATIENT)
Age: 59
End: 2024-10-30

## 2024-10-30 DIAGNOSIS — K76.0 FATTY (CHANGE OF) LIVER, NOT ELSEWHERE CLASSIFIED: ICD-10-CM

## 2024-10-31 ENCOUNTER — APPOINTMENT (OUTPATIENT)
Dept: ULTRASOUND IMAGING | Facility: IMAGING CENTER | Age: 59
End: 2024-10-31
Payer: COMMERCIAL

## 2024-10-31 ENCOUNTER — OUTPATIENT (OUTPATIENT)
Dept: OUTPATIENT SERVICES | Facility: HOSPITAL | Age: 59
LOS: 1 days | End: 2024-10-31
Payer: COMMERCIAL

## 2024-10-31 DIAGNOSIS — K76.0 FATTY (CHANGE OF) LIVER, NOT ELSEWHERE CLASSIFIED: ICD-10-CM

## 2024-10-31 DIAGNOSIS — Z96.641 PRESENCE OF RIGHT ARTIFICIAL HIP JOINT: Chronic | ICD-10-CM

## 2024-10-31 PROCEDURE — 76700 US EXAM ABDOM COMPLETE: CPT

## 2024-10-31 PROCEDURE — 76700 US EXAM ABDOM COMPLETE: CPT | Mod: 26

## 2024-11-01 ENCOUNTER — NON-APPOINTMENT (OUTPATIENT)
Age: 59
End: 2024-11-01

## 2024-12-17 ENCOUNTER — APPOINTMENT (OUTPATIENT)
Dept: HEPATOLOGY | Facility: CLINIC | Age: 59
End: 2024-12-17
Payer: COMMERCIAL

## 2024-12-17 VITALS
SYSTOLIC BLOOD PRESSURE: 124 MMHG | TEMPERATURE: 96 F | DIASTOLIC BLOOD PRESSURE: 70 MMHG | HEART RATE: 61 BPM | WEIGHT: 217 LBS | OXYGEN SATURATION: 98 % | BODY MASS INDEX: 32.89 KG/M2 | HEIGHT: 68 IN

## 2024-12-17 DIAGNOSIS — K76.0 FATTY (CHANGE OF) LIVER, NOT ELSEWHERE CLASSIFIED: ICD-10-CM

## 2024-12-17 DIAGNOSIS — K74.00 HEPATIC FIBROSIS, UNSPECIFIED: ICD-10-CM

## 2024-12-17 DIAGNOSIS — K70.9 ALCOHOLIC LIVER DISEASE, UNSPECIFIED: ICD-10-CM

## 2024-12-17 PROCEDURE — 99215 OFFICE O/P EST HI 40 MIN: CPT

## 2024-12-20 LAB
AFP-TM SERPL-MCNC: 4.4 NG/ML
ALBUMIN SERPL ELPH-MCNC: 4 G/DL
ALP BLD-CCNC: 107 U/L
ALT SERPL-CCNC: 39 U/L
ANION GAP SERPL CALC-SCNC: 14 MMOL/L
AST SERPL-CCNC: 52 U/L
BILIRUB DIRECT SERPL-MCNC: 0.3 MG/DL
BILIRUB SERPL-MCNC: 0.8 MG/DL
BUN SERPL-MCNC: 11 MG/DL
CALCIUM SERPL-MCNC: 9.6 MG/DL
CHLORIDE SERPL-SCNC: 93 MMOL/L
CO2 SERPL-SCNC: 22 MMOL/L
CREAT SERPL-MCNC: 0.78 MG/DL
EGFR: 102 ML/MIN/1.73M2
ESTIMATED AVERAGE GLUCOSE: 117 MG/DL
GLUCOSE SERPL-MCNC: 180 MG/DL
HBA1C MFR BLD HPLC: 5.7 %
HCT VFR BLD CALC: 37.4 %
HGB BLD-MCNC: 12.5 G/DL
INR PPP: 1.18 RATIO
MCHC RBC-ENTMCNC: 33 PG
MCHC RBC-ENTMCNC: 33.4 G/DL
MCV RBC AUTO: 98.7 FL
PLATELET # BLD AUTO: 176 K/UL
POTASSIUM SERPL-SCNC: 4.4 MMOL/L
PROT SERPL-MCNC: 7.8 G/DL
PT BLD: 13.9 SEC
RBC # BLD: 3.79 M/UL
RBC # FLD: 11.9 %
SODIUM SERPL-SCNC: 130 MMOL/L
WBC # FLD AUTO: 6.71 K/UL

## 2025-01-29 ENCOUNTER — OUTPATIENT (OUTPATIENT)
Dept: OUTPATIENT SERVICES | Facility: HOSPITAL | Age: 60
LOS: 1 days | End: 2025-01-29
Payer: COMMERCIAL

## 2025-01-29 ENCOUNTER — APPOINTMENT (OUTPATIENT)
Dept: MRI IMAGING | Facility: CLINIC | Age: 60
End: 2025-01-29
Payer: COMMERCIAL

## 2025-01-29 ENCOUNTER — RESULT REVIEW (OUTPATIENT)
Age: 60
End: 2025-01-29

## 2025-01-29 DIAGNOSIS — Z96.641 PRESENCE OF RIGHT ARTIFICIAL HIP JOINT: Chronic | ICD-10-CM

## 2025-01-29 DIAGNOSIS — K74.00 HEPATIC FIBROSIS, UNSPECIFIED: ICD-10-CM

## 2025-01-29 PROCEDURE — 74183 MRI ABD W/O CNTR FLWD CNTR: CPT

## 2025-01-29 PROCEDURE — 76391 MR ELASTOGRAPHY: CPT | Mod: 26

## 2025-01-29 PROCEDURE — 74183 MRI ABD W/O CNTR FLWD CNTR: CPT | Mod: 26

## 2025-01-29 PROCEDURE — A9585: CPT

## 2025-01-29 PROCEDURE — 76391 MR ELASTOGRAPHY: CPT

## 2025-01-31 ENCOUNTER — NON-APPOINTMENT (OUTPATIENT)
Age: 60
End: 2025-01-31

## 2025-02-21 ENCOUNTER — APPOINTMENT (OUTPATIENT)
Dept: UROLOGY | Facility: CLINIC | Age: 60
End: 2025-02-21
Payer: COMMERCIAL

## 2025-02-21 ENCOUNTER — NON-APPOINTMENT (OUTPATIENT)
Age: 60
End: 2025-02-21

## 2025-02-21 VITALS
OXYGEN SATURATION: 97 % | TEMPERATURE: 98.7 F | SYSTOLIC BLOOD PRESSURE: 174 MMHG | WEIGHT: 210 LBS | HEIGHT: 68 IN | HEART RATE: 78 BPM | BODY MASS INDEX: 31.83 KG/M2 | DIASTOLIC BLOOD PRESSURE: 91 MMHG

## 2025-02-21 DIAGNOSIS — N13.30 UNSPECIFIED HYDRONEPHROSIS: ICD-10-CM

## 2025-02-21 DIAGNOSIS — N40.1 BENIGN PROSTATIC HYPERPLASIA WITH LOWER URINARY TRACT SYMPMS: ICD-10-CM

## 2025-02-21 DIAGNOSIS — Z82.49 FAMILY HISTORY OF ISCHEMIC HEART DISEASE AND OTHER DISEASES OF THE CIRCULATORY SYSTEM: ICD-10-CM

## 2025-02-21 DIAGNOSIS — N13.8 BENIGN PROSTATIC HYPERPLASIA WITH LOWER URINARY TRACT SYMPMS: ICD-10-CM

## 2025-02-21 DIAGNOSIS — Z83.3 FAMILY HISTORY OF DIABETES MELLITUS: ICD-10-CM

## 2025-02-21 PROCEDURE — G2211 COMPLEX E/M VISIT ADD ON: CPT | Mod: NC

## 2025-02-21 PROCEDURE — 99204 OFFICE O/P NEW MOD 45 MIN: CPT

## 2025-02-21 RX ORDER — TAMSULOSIN HYDROCHLORIDE 0.4 MG/1
0.4 CAPSULE ORAL
Qty: 90 | Refills: 3 | Status: ACTIVE | COMMUNITY
Start: 2025-02-21 | End: 1900-01-01

## 2025-02-21 RX ORDER — FINASTERIDE 5 MG/1
5 TABLET, FILM COATED ORAL
Qty: 90 | Refills: 3 | Status: ACTIVE | COMMUNITY
Start: 2025-02-21 | End: 1900-01-01

## 2025-02-25 PROBLEM — N40.1 BPH WITH OBSTRUCTION/LOWER URINARY TRACT SYMPTOMS: Status: ACTIVE | Noted: 2025-02-21

## 2025-02-25 PROBLEM — N13.30 HYDRONEPHROSIS, BILATERAL: Status: ACTIVE | Noted: 2025-02-25

## 2025-02-25 LAB
ANION GAP SERPL CALC-SCNC: 11 MMOL/L
APPEARANCE: CLEAR
BACTERIA UR CULT: NORMAL
BACTERIA: NEGATIVE /HPF
BILIRUBIN URINE: NEGATIVE
BLOOD URINE: NEGATIVE
BUN SERPL-MCNC: 7 MG/DL
CALCIUM SERPL-MCNC: 9.7 MG/DL
CAST: 0 /LPF
CHLORIDE SERPL-SCNC: 95 MMOL/L
CO2 SERPL-SCNC: 26 MMOL/L
COLOR: YELLOW
CREAT SERPL-MCNC: 0.77 MG/DL
EGFR: 102 ML/MIN/1.73M2
EPITHELIAL CELLS: 0 /HPF
GLUCOSE QUALITATIVE U: NEGATIVE MG/DL
GLUCOSE SERPL-MCNC: 131 MG/DL
KETONES URINE: NEGATIVE MG/DL
LEUKOCYTE ESTERASE URINE: NEGATIVE
MICROSCOPIC-UA: NORMAL
NITRITE URINE: NEGATIVE
PH URINE: 6.5
POTASSIUM SERPL-SCNC: 5 MMOL/L
PROTEIN URINE: NEGATIVE MG/DL
RED BLOOD CELLS URINE: 0 /HPF
SODIUM SERPL-SCNC: 132 MMOL/L
SPECIFIC GRAVITY URINE: 1.01
UROBILINOGEN URINE: 0.2 MG/DL
WHITE BLOOD CELLS URINE: 0 /HPF

## 2025-03-19 ENCOUNTER — APPOINTMENT (OUTPATIENT)
Age: 60
End: 2025-03-19

## 2025-03-19 DIAGNOSIS — N13.30 UNSPECIFIED HYDRONEPHROSIS: ICD-10-CM

## 2025-03-19 DIAGNOSIS — N13.8 BENIGN PROSTATIC HYPERPLASIA WITH LOWER URINARY TRACT SYMPMS: ICD-10-CM

## 2025-03-19 DIAGNOSIS — N40.1 BENIGN PROSTATIC HYPERPLASIA WITH LOWER URINARY TRACT SYMPMS: ICD-10-CM

## 2025-03-19 PROCEDURE — G2211 COMPLEX E/M VISIT ADD ON: CPT | Mod: NC

## 2025-03-19 PROCEDURE — 99214 OFFICE O/P EST MOD 30 MIN: CPT

## 2025-05-06 ENCOUNTER — APPOINTMENT (OUTPATIENT)
Dept: HEPATOLOGY | Facility: CLINIC | Age: 60
End: 2025-05-06
Payer: COMMERCIAL

## 2025-05-06 VITALS
SYSTOLIC BLOOD PRESSURE: 118 MMHG | TEMPERATURE: 97.7 F | HEIGHT: 68 IN | BODY MASS INDEX: 32.28 KG/M2 | WEIGHT: 213 LBS | DIASTOLIC BLOOD PRESSURE: 70 MMHG

## 2025-05-06 DIAGNOSIS — F10.90 FATTY (CHANGE OF) LIVER, NOT ELSEWHERE CLASSIFIED: ICD-10-CM

## 2025-05-06 DIAGNOSIS — K76.0 FATTY (CHANGE OF) LIVER, NOT ELSEWHERE CLASSIFIED: ICD-10-CM

## 2025-05-06 PROCEDURE — 99215 OFFICE O/P EST HI 40 MIN: CPT

## 2025-05-06 RX ORDER — PNV NO.95/FERROUS FUM/FOLIC AC 28MG-0.8MG
100 TABLET ORAL
Refills: 0 | Status: ACTIVE | COMMUNITY

## 2025-05-07 DIAGNOSIS — K74.60 UNSPECIFIED CIRRHOSIS OF LIVER: ICD-10-CM

## 2025-05-07 LAB
AFP-TM SERPL-MCNC: 7.3 NG/ML
ALBUMIN SERPL ELPH-MCNC: 4.5 G/DL
ALP BLD-CCNC: 123 U/L
ALT SERPL-CCNC: 54 U/L
ANION GAP SERPL CALC-SCNC: 17 MMOL/L
AST SERPL-CCNC: 109 U/L
BILIRUB DIRECT SERPL-MCNC: 0.5 MG/DL
BILIRUB SERPL-MCNC: 1.6 MG/DL
BUN SERPL-MCNC: 15 MG/DL
CALCIUM SERPL-MCNC: 9.7 MG/DL
CHLORIDE SERPL-SCNC: 94 MMOL/L
CO2 SERPL-SCNC: 19 MMOL/L
CREAT SERPL-MCNC: 0.83 MG/DL
EGFRCR SERPLBLD CKD-EPI 2021: 100 ML/MIN/1.73M2
ESTIMATED AVERAGE GLUCOSE: 111 MG/DL
GLUCOSE SERPL-MCNC: 117 MG/DL
HBA1C MFR BLD HPLC: 5.5 %
HCT VFR BLD CALC: 34 %
HGB BLD-MCNC: 12 G/DL
INR PPP: 1.23 RATIO
MCHC RBC-ENTMCNC: 34.6 PG
MCHC RBC-ENTMCNC: 35.3 G/DL
MCV RBC AUTO: 98 FL
PLATELET # BLD AUTO: 161 K/UL
POTASSIUM SERPL-SCNC: 5.5 MMOL/L
PROT SERPL-MCNC: 8.9 G/DL
PT BLD: 14.5 SEC
RBC # BLD: 3.47 M/UL
RBC # FLD: 11.9 %
SODIUM SERPL-SCNC: 129 MMOL/L
TSH SERPL-ACNC: 1.85 UIU/ML
WBC # FLD AUTO: 5.2 K/UL

## 2025-05-15 ENCOUNTER — APPOINTMENT (OUTPATIENT)
Dept: ULTRASOUND IMAGING | Facility: CLINIC | Age: 60
End: 2025-05-15
Payer: COMMERCIAL

## 2025-05-15 LAB
ALBUMIN SERPL ELPH-MCNC: 4.1 G/DL
ALP BLD-CCNC: 107 U/L
ALT SERPL-CCNC: 57 U/L
ANION GAP SERPL CALC-SCNC: 16 MMOL/L
AST SERPL-CCNC: 97 U/L
BILIRUB SERPL-MCNC: 0.6 MG/DL
BUN SERPL-MCNC: 9 MG/DL
CALCIUM SERPL-MCNC: 9 MG/DL
CHLORIDE SERPL-SCNC: 95 MMOL/L
CO2 SERPL-SCNC: 18 MMOL/L
CREAT SERPL-MCNC: 0.88 MG/DL
EGFRCR SERPLBLD CKD-EPI 2021: 98 ML/MIN/1.73M2
GLUCOSE SERPL-MCNC: 150 MG/DL
POTASSIUM SERPL-SCNC: 5.2 MMOL/L
PROT SERPL-MCNC: 8.1 G/DL
SODIUM SERPL-SCNC: 129 MMOL/L

## 2025-05-15 PROCEDURE — 76700 US EXAM ABDOM COMPLETE: CPT

## 2025-06-18 ENCOUNTER — APPOINTMENT (OUTPATIENT)
Age: 60
End: 2025-06-18
Payer: COMMERCIAL

## 2025-06-18 VITALS
SYSTOLIC BLOOD PRESSURE: 166 MMHG | HEART RATE: 77 BPM | BODY MASS INDEX: 32.58 KG/M2 | DIASTOLIC BLOOD PRESSURE: 81 MMHG | OXYGEN SATURATION: 97 % | WEIGHT: 215 LBS | TEMPERATURE: 97.6 F | HEIGHT: 68 IN

## 2025-06-18 PROCEDURE — 99214 OFFICE O/P EST MOD 30 MIN: CPT

## 2025-06-18 PROCEDURE — G2211 COMPLEX E/M VISIT ADD ON: CPT | Mod: NC

## 2025-07-18 ENCOUNTER — APPOINTMENT (OUTPATIENT)
Age: 60
End: 2025-07-18
Payer: COMMERCIAL

## 2025-07-18 PROCEDURE — G2211 COMPLEX E/M VISIT ADD ON: CPT | Mod: NC

## 2025-07-18 PROCEDURE — 99215 OFFICE O/P EST HI 40 MIN: CPT

## 2025-07-18 RX ORDER — NITROFURANTOIN (MONOHYDRATE/MACROCRYSTALS) 25; 75 MG/1; MG/1
100 CAPSULE ORAL
Qty: 10 | Refills: 0 | Status: ACTIVE | COMMUNITY
Start: 2025-07-18 | End: 1900-01-01

## 2025-07-24 LAB — PSA SERPL-MCNC: 0.15 NG/ML

## 2025-07-31 PROBLEM — R33.9 URINARY RETENTION WITH INCOMPLETE BLADDER EMPTYING: Status: ACTIVE | Noted: 2025-06-19

## 2025-08-01 ENCOUNTER — APPOINTMENT (OUTPATIENT)
Age: 60
End: 2025-08-01
Payer: COMMERCIAL

## 2025-08-01 DIAGNOSIS — N40.1 BENIGN PROSTATIC HYPERPLASIA WITH LOWER URINARY TRACT SYMPMS: ICD-10-CM

## 2025-08-01 DIAGNOSIS — N13.30 UNSPECIFIED HYDRONEPHROSIS: ICD-10-CM

## 2025-08-01 DIAGNOSIS — N13.8 BENIGN PROSTATIC HYPERPLASIA WITH LOWER URINARY TRACT SYMPMS: ICD-10-CM

## 2025-08-01 DIAGNOSIS — R33.9 RETENTION OF URINE, UNSPECIFIED: ICD-10-CM

## 2025-08-01 PROCEDURE — 99214 OFFICE O/P EST MOD 30 MIN: CPT

## 2025-08-01 PROCEDURE — G2211 COMPLEX E/M VISIT ADD ON: CPT | Mod: NC

## (undated) DEVICE — SUT SOFSILK 2-0 18" C-15

## (undated) DEVICE — DRSG WEBRIL 3"

## (undated) DEVICE — GLV 7.5 PROTEXIS (BLUE)

## (undated) DEVICE — BUR STRYKER MICRO ROUND POLISHING 2X54MM 18 FLUTES

## (undated) DEVICE — BUR MICROAIRE CARBIDE ROUND 4MM

## (undated) DEVICE — TOURNIQUET ESMARK 4"

## (undated) DEVICE — DRAPE TOWEL BLUE 17" X 24"

## (undated) DEVICE — DRSG ACE BANDAGE 4"

## (undated) DEVICE — SUT MONOSOF 3-0 30" C-16

## (undated) DEVICE — SUT BIOSYN 4-0 18" P-13

## (undated) DEVICE — DRSG STERISTRIPS 0.5 X 4"

## (undated) DEVICE — ELCTR BIPOLAR CORD 12FT

## (undated) DEVICE — SUT NYLON 5-0 18" R-5

## (undated) DEVICE — WARMING BLANKET LOWER ADULT

## (undated) DEVICE — DRSG CURITY GAUZE SPONGE 4 X 4" 12-PLY

## (undated) DEVICE — SUT POLYSORB 3-0 18" V-20 UNDYED

## (undated) DEVICE — BLADE SCALPEL SAFETYLOCK #15

## (undated) DEVICE — SUT MONOSOF 4-0 18" C-13

## (undated) DEVICE — POSITIONER STRAP ARMBOARD VELCRO TS-30

## (undated) DEVICE — DRSG STOCKINETTE TUBULAR COTTON 2PLY 2X2.5"

## (undated) DEVICE — SUT ETHIBOND 3-0 30" V-5

## (undated) DEVICE — SYM-TOURNIQUET 3013LAAP: Type: DURABLE MEDICAL EQUIPMENT

## (undated) DEVICE — PACK UPPER EXTREMITY

## (undated) DEVICE — NDL HYPO REGULAR BEVEL 25G X 1.5" (BLUE)

## (undated) DEVICE — CANISTER SUCTION LID GUARD 3000CC

## (undated) DEVICE — SUT POLYSORB 2-0 18" V-20

## (undated) DEVICE — DRAPE SURGICAL #1010

## (undated) DEVICE — Device

## (undated) DEVICE — SUT BIOSYN 5-0 18" P-13

## (undated) DEVICE — GLV 7 PROTEXIS (WHITE)

## (undated) DEVICE — SOLIDIFIER CANN EXPRESS 3K

## (undated) DEVICE — VENODYNE/SCD SLEEVE CALF MEDIUM

## (undated) DEVICE — TOURNIQUET CUFF 18" DUAL PORT SINGLE BLADDER W PLC  (BLACK)

## (undated) DEVICE — POSITIONER FOAM HEAD CRADLE (PINK)

## (undated) DEVICE — DRAPE C ARM MINI

## (undated) DEVICE — SUT POLYSORB 4-0 30" C-13 UNDYED